# Patient Record
Sex: MALE | Race: WHITE | Employment: FULL TIME | URBAN - METROPOLITAN AREA
[De-identification: names, ages, dates, MRNs, and addresses within clinical notes are randomized per-mention and may not be internally consistent; named-entity substitution may affect disease eponyms.]

---

## 2017-02-17 ENCOUNTER — VIRTUAL VISIT (OUTPATIENT)
Dept: NURSING | Facility: CLINIC | Age: 57
End: 2017-02-17

## 2017-02-17 DIAGNOSIS — E66.9 OBESITY: Primary | ICD-10-CM

## 2017-02-17 PROCEDURE — 99207 ZZC HEALTH COACHING, NO CHARGE: CPT

## 2017-02-17 NOTE — MR AVS SNAPSHOT
After Visit Summary   2/17/2017    Sanket Flores    MRN: [id number]           Patient Information     Date Of Birth          1960        Visit Information        Provider Department      2/17/2017 8:00 AM HEALTH  - [facility]        Today's Diagnoses     Obesity    -  1       Follow-ups after your visit        Who to contact     If you have questions or need follow up information about today's clinic visit or your schedule please contact [facility] directly at [phone].  Normal or non-critical lab and imaging results will be communicated to you by exsulinhart, letter or phone within 4 business days after the clinic has received the results. If you do not hear from us within 7 days, please contact the clinic through exsulinhart or phone. If you have a critical or abnormal lab result, we will notify you by phone as soon as possible.  Submit refill requests through [employer] or call your pharmacy and they will forward the refill request to us. Please allow 3 business days for your refill to be completed.          Additional Information About Your Visit        MyChart Information     [employer] gives you secure access to your electronic health record. If you see a primary care provider, you can also send messages to your care team and make appointments. If you have questions, please call your primary care clinic.  If you do not have a primary care provider, please call [phone] and they will assist you.        Care EveryWhere ID     This is your Care EveryWhere ID. This could be used by other organizations to access your [place] medical records  [id number]         Blood Pressure from Last 3 Encounters:   09/20/16 122/74   06/17/16 123/79   05/06/15 112/77    Weight from Last 3 Encounters:   09/20/16 278 lb 12.8 oz (126.5 kg)   06/17/16 285 lb (129.3 kg)   05/06/15 277 lb (125.6 kg)              Today, you had the following     No orders found for  display       Primary Care Provider Office Phone # Fax #    Anish Morales [phone]       [facility] [address]  [facility] [address]        Thank you!     Thank you for choosing [facility]  for your care. Our goal is always to provide you with excellent care. Hearing back from our patients is one way we can continue to improve our services. Please take a few minutes to complete the written survey that you may receive in the mail after your visit with us. Thank you!             Your Updated Medication List - Protect others around you: Learn how to safely use, store and throw away your medicines at www.disposemymeds.org.          This list is accurate as of: 2/17/17  9:43 AM.  Always use your most recent med list.                   Brand Name Dispense Instructions for use    fexofenadine 180 MG tablet    ALLEGRA    90 tablet    Take  by mouth. 1 tab PO qDay       fish oil-omega-3 fatty acids 1000 MG capsule      Take 2 g by mouth daily.       glucosamine-chondroitin 500-400 MG Caps per capsule      Take 1 capsule by mouth daily       order for DME     1 Units    Equipment being ordered: CPAP and needed supplies, pressure setting 13       ranitidine 150 MG tablet    ZANTAC    180 tablet    Take 1 tablet (150 mg) by mouth 2 times daily       REMICADE 100 MG injection   Generic drug:  inFLIXimab      once       simvastatin 40 MG tablet    ZOCOR    90 tablet    Take 1 tablet (40 mg) by mouth At Bedtime       SUMAtriptan 25 MG tablet    IMITREX    18 tablet    Take 1-2 tablets (25-50 mg) by mouth at onset of headache for migraine May repeat dose in 2 hours.  Do not exceed 200 mg in 24 hours

## 2017-02-17 NOTE — PROGRESS NOTES
February 17, 2017    [address] [id number] [phone]  Health Coaching Progress Note    Patient Name: Sanket Flores Date: February 17, 2017      Session Length: 30      DATA    PRM Master Survey Scores Reviewed: Yes    Core Healthy Days Survey:    Would you say that in general your health is: : Good    TIFFANI Score (Last Two) 7/29/2016 2/17/2017   TIFFANI Raw Score 44 36   Activation Score 70.8 75.5   TIFFANI Level 4 4       PHQ-2 Score 2/17/2017 7/29/2016   PHQ-2 Total Score Interpretation - Positive if 3 or more points; Administer PHQ-9 if positive 0 0       No flowsheet data found.    Treatment Objective(s) Addressed in This Session:  Target Behavior(s): diet/weight loss and disease management/lifestyle changes of continuing to work on weight loss by eating healthier foods-more veggies/protein, reducing portion sizes, learning ways to help manage stress- what is in control vs what is not, keeping a healthy work/life balance, continuing to be more active-tracking daily steps, walking his dog regularly/has to be done,  getting enough sleep, rest and recovery, making a healthy routine/personal time a priority.     Current Stressors / Issues:  Pt. is not happy with himself on progress/feels he can do better, keeping unhealthy food out of house/working on spouse on this, finding ways to be more active daily-got a new dog that needs to be walked, work life balance-work is very stressful right now, lives with Ulcerative Colitis/wondering if this has something to do with recent pain issuse, gluten?, eating the right kinds of food,  little time for self/making personal time a priority, soda intake-stopped drinking diet soda and lost 15lbs, recently built a new house/moved-still putting things away, stress-wife's health-lives with Fibromyalgia/pain      What Patient Does Well:   1) Pt. Continues to make healthy changes to  his routine-really hopes to make exercise a big part of routine and stick to it.  Previous Successes:   1) Pt. Reports he is down at least 13lbs over the past few months on home scale/Pt. hasn't weighed in at clinic since September  Areas in Need of Improvement:   1) Staying consistent on daily routine  2) Exercise-making this a priority  3) Diet  4) Weight loss  5) Stress management  Barriers to Change:   1) Ankle Injury/Shoulder pain/other recent aches/pains-making sure not to overdue it!  2) Time-work/life balance/Work Stress-has been a bigger struggle lately  3) Getting back in routine and sticking to it  Reasons for Change:   1) Be healthier/lose weight  2) Feel better/move better  3) Be around for family  Plan/Goal for the Next 4 Weeks:   GOAL #1: Build a healthy daily routine/enough sleep/stress management-try different techniques (projects at home, riding motorcycle, taking time for self, walking, deep breathing, etc.)  GOAL #1 Progress Toward Goal: 100% (Pt. feels he has a great foundation to help healthy routine/continues to find new methods to manage stressors)  GOAL #2: Get back in routine of doing morning calistenics routine (push-ups, sit-ups, crunches, stretches)  GOAL #2 Progress Toward Goal: Goal no longer appropriate (Doesn't feel his body can do this routine like he used to/walking instead)  GOAL #3: Contine to be active daily by walking/tracking step count/shoot for 10,000 steps daily  GOAL #3 Progress Toward Goal: 75% (Continues being more active/walking the dog-still room for improvement/weather improving)  GOAL #4: Be more aware of dietary choices and use the proper portion size at meals  GOAL #4 Progress Toward Goal: 75% (Working to improve diet/still struggles with spouse bringing home unhealthy foods)  GOAL #5: Learn more about ulcerative colitis and joint pain/try to avoid foods that can cause inflammation  GOAL #5 Progress Toward Goal: 50% (Continuing to research this topic and read  through handouts provided)    Intervention:  Motivational Interviewing    MI Intervention: Expressed Empathy/Understanding, Supported Autonomy, Collaboration, Evocation, Permission to raise concern or advise, Open-ended questions, Rolled with resistance: Simple reflection, Complex reflection, Shifted topic to defuse resistance and Reframed sustain talk in the direction of change and Change talk (evoked)     Change Talk Expressed by the Patient: Desire to change Ability to change Reasons to change Need to change Committment to change Activation Taking steps    Provider Response to Change Talk: E - Evoked more info from patient about behavior change, A - Affirmed patient's thoughts, decisions, or attempts at behavior change, R - Reflected patient's change talk and S - Summarized patient's change talk statements    Assessment / Progress on Treatment Objective(s) / Homework:    Achieved / completed to satisfaction - MAINTENANCE (Working to maintain change, with risk of relapse); Intervened by continuing to positively reinforce healthy behavior choice   Satisfactory progress - ACTION (Actively working towards change); Intervened by reinforcing change plan / affirming steps taken  Stable - PREPARATION (Decided to change - considering how); Intervened by negotiating a change plan and determining options / strategies for behavior change, identifying triggers, exploring social supports, and working towards setting a date to begin behavior change         Plan: (Homework, other):  Patient was encouraged to continue to seek condition-related information and education, as well as schedule a follow up appointment with the Health  as needed. Patient has set self-identified goals and will monitor progress until the next appointment.  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      Rey Morales

## 2017-03-21 ENCOUNTER — TRANSFERRED RECORDS (OUTPATIENT)
Dept: HEALTH INFORMATION MANAGEMENT | Facility: CLINIC | Age: 57
End: 2017-03-21

## 2017-04-22 DIAGNOSIS — K21.9 GASTROESOPHAGEAL REFLUX DISEASE WITHOUT ESOPHAGITIS: ICD-10-CM

## 2017-04-22 NOTE — TELEPHONE ENCOUNTER
Ranitidine      Last Written Prescription Date: 06/17/16  Last Fill Quantity: 180,  # refills: 3   Last Office Visit with G, [facility] or [facility] prescribing provider: 06/17/16 w/Dr Blevins

## 2017-04-24 NOTE — TELEPHONE ENCOUNTER
Should have refills on file through June 2017. One 90 day refill sent.   Prescription approved per [facility] Refill Protocol.  Gm Stauffer RN

## 2017-05-10 ENCOUNTER — TRANSFERRED RECORDS (OUTPATIENT)
Dept: HEALTH INFORMATION MANAGEMENT | Facility: CLINIC | Age: 57
End: 2017-05-10

## 2017-06-27 DIAGNOSIS — Z13.6 CARDIOVASCULAR SCREENING; LDL GOAL LESS THAN 130: ICD-10-CM

## 2017-06-27 RX ORDER — SIMVASTATIN 40 MG
TABLET ORAL
Start: 2017-06-27

## 2017-06-27 NOTE — TELEPHONE ENCOUNTER
simvastatin (ZOCOR) 40 MG tablet    Last Written Prescription Date: 06/17/2016  Last Fill Quantity: 90,03/21/2017 # refills: 3    Last Office Visit with G, P or [facility] prescribing provider:  06/17/2016-Dr Blevins   Future Office Visit:      Cholesterol   Date Value Ref Range Status   06/17/2016 149 <200 mg/dL Final     HDL Cholesterol   Date Value Ref Range Status   06/17/2016 42 >39 mg/dL Final     LDL Cholesterol Calculated   Date Value Ref Range Status   06/17/2016 77 <100 mg/dL Final     Comment:     Desirable:       <100 mg/dl     Triglycerides   Date Value Ref Range Status   06/17/2016 149 <150 mg/dL Final     Comment:     Fasting specimen     Cholesterol/HDL Ratio   Date Value Ref Range Status   05/06/2015 3.0 0.0 - 5.0 Final     ALT   Date Value Ref Range Status   04/21/2016 72 U/L Final

## 2017-06-27 NOTE — TELEPHONE ENCOUNTER
We called Sanket, scheduled fasting visit 6/30/17. He declines a 90 day refill, rather will request at visit. Pharmacy notified.  Gm Stauffer RN

## 2017-06-30 ENCOUNTER — OFFICE VISIT (OUTPATIENT)
Dept: FAMILY MEDICINE | Facility: CLINIC | Age: 57
End: 2017-06-30
Payer: COMMERCIAL

## 2017-06-30 VITALS
TEMPERATURE: 97.6 F | WEIGHT: 283 LBS | HEART RATE: 64 BPM | DIASTOLIC BLOOD PRESSURE: 79 MMHG | RESPIRATION RATE: 16 BRPM | OXYGEN SATURATION: 97 % | BODY MASS INDEX: 40.52 KG/M2 | SYSTOLIC BLOOD PRESSURE: 134 MMHG | HEIGHT: 70 IN

## 2017-06-30 DIAGNOSIS — K51.811 OTHER ULCERATIVE COLITIS WITH RECTAL BLEEDING (H): ICD-10-CM

## 2017-06-30 DIAGNOSIS — K21.9 GASTROESOPHAGEAL REFLUX DISEASE WITHOUT ESOPHAGITIS: ICD-10-CM

## 2017-06-30 DIAGNOSIS — E78.5 HYPERLIPIDEMIA LDL GOAL <100: ICD-10-CM

## 2017-06-30 DIAGNOSIS — F43.22 ADJUSTMENT DISORDER WITH ANXIOUS MOOD: ICD-10-CM

## 2017-06-30 DIAGNOSIS — E66.01 MORBID OBESITY DUE TO EXCESS CALORIES (H): Primary | ICD-10-CM

## 2017-06-30 LAB
ANION GAP SERPL CALCULATED.3IONS-SCNC: 6 MMOL/L (ref 3–14)
BUN SERPL-MCNC: 13 MG/DL (ref 7–30)
CALCIUM SERPL-MCNC: 9.1 MG/DL (ref 8.5–10.1)
CHLORIDE SERPL-SCNC: 108 MMOL/L (ref 94–109)
CHOLEST SERPL-MCNC: 112 MG/DL
CO2 SERPL-SCNC: 25 MMOL/L (ref 20–32)
CREAT SERPL-MCNC: 0.85 MG/DL (ref 0.66–1.25)
GFR SERPL CREATININE-BSD FRML MDRD: ABNORMAL ML/MIN/1.7M2
GLUCOSE SERPL-MCNC: 111 MG/DL (ref 70–99)
HDLC SERPL-MCNC: 31 MG/DL
LDLC SERPL CALC-MCNC: 37 MG/DL
NONHDLC SERPL-MCNC: 81 MG/DL
POTASSIUM SERPL-SCNC: 4.3 MMOL/L (ref 3.4–5.3)
SODIUM SERPL-SCNC: 139 MMOL/L (ref 133–144)
TRIGL SERPL-MCNC: 220 MG/DL

## 2017-06-30 PROCEDURE — 80061 LIPID PANEL: CPT | Performed by: FAMILY MEDICINE

## 2017-06-30 PROCEDURE — 99214 OFFICE O/P EST MOD 30 MIN: CPT | Performed by: FAMILY MEDICINE

## 2017-06-30 PROCEDURE — 36415 COLL VENOUS BLD VENIPUNCTURE: CPT | Performed by: FAMILY MEDICINE

## 2017-06-30 PROCEDURE — 80048 BASIC METABOLIC PNL TOTAL CA: CPT | Performed by: FAMILY MEDICINE

## 2017-06-30 RX ORDER — SIMVASTATIN 40 MG
40 TABLET ORAL AT BEDTIME
Qty: 90 TABLET | Refills: 3 | Status: SHIPPED | OUTPATIENT
Start: 2017-06-30

## 2017-06-30 NOTE — PROGRESS NOTES
SUBJECTIVE:                                                    Sanket Flores is a 57 year old male who presents to clinic today for the following health issues:      Hyperlipidemia Follow-Up      Rate your low fat/cholesterol diet?: poor    Taking statin?  Yes, no muscle aches from statin    Other lipid medications/supplements?:  none      Amount of exercise or physical activity: None    Problems taking medications regularly: No    Medication side effects: none    Diet: regular (no restrictions)      Pt is feeling stressed out in general, agapito when he is at work, he feels he is always wand up and not relaxed.  Gets better when he is on vacation.  Stressors : he needs to be in control, and he feels he his job.  Also he is closing on a house soon, and that is stressful.      Problem list and histories reviewed & adjusted, as indicated.  Additional history: as documented    Patient Active Problem List   Diagnosis     Tobacco use disorder     Atopic rhinitis     CARDIOVASCULAR SCREENING; LDL GOAL LESS THAN 130     Ulcerative colitis (H)     Ulcerative colitis, acute (H)     Anemia     Migraine     PACHECO (obstructive sleep apnea)     Morbid obesity due to excess calories (H)     Past Surgical History:   Procedure Laterality Date     C NONSPECIFIC PROCEDURE  1966    hernia L     C NONSPECIFIC PROCEDURE  1972    compound fx R femur       Social History   Substance Use Topics     Smoking status: Former Smoker     Packs/day: 0.50     Years: 20.00     Types: Cigarettes     Quit date: 2/8/2012     Smokeless tobacco: Never Used      Comment: using e-cigarette     Alcohol use 0.0 oz/week     0 Standard drinks or equivalent per week      Comment: occ beer     Family History   Problem Relation Age of Onset     Cancer - colorectal No family hx of      Prostate Cancer No family hx of          Current Outpatient Prescriptions   Medication Sig Dispense Refill     ranitidine (ZANTAC) 150 MG tablet Take 1 tablet (150 mg) by mouth 2  "times daily 180 tablet 3     simvastatin (ZOCOR) 40 MG tablet Take 1 tablet (40 mg) by mouth At Bedtime 90 tablet 3     glucosamine-chondroitin 500-400 MG CAPS Take 1 capsule by mouth daily       SUMAtriptan (IMITREX) 25 MG tablet Take 1-2 tablets (25-50 mg) by mouth at onset of headache for migraine May repeat dose in 2 hours.  Do not exceed 200 mg in 24 hours 18 tablet 3     inFLIXimab (REMICADE) 100 MG injection once       ORDER FOR DME Equipment being ordered: CPAP and needed supplies, pressure setting 13 1 Units 0     fish oil-omega-3 fatty acids (FISH OIL) 1000 MG capsule Take 2 g by mouth daily.       fexofenadine (ALLEGRA) 180 MG tablet Take  by mouth. 1 tab PO qDay 90 tablet 3     [DISCONTINUED] simvastatin (ZOCOR) 40 MG tablet Take 1 tablet (40 mg) by mouth At Bedtime 90 tablet 3     Allergies   Allergen Reactions     Meperidine Hcl      Demerol       Reviewed and updated as needed this visit by clinical staff  Tobacco  Allergies  Fam Hx  Soc Hx      Reviewed and updated as needed this visit by Provider         ROS:  C: NEGATIVE for fever, chills, change in weight  CV: NEGATIVE for chest pain, palpitations or peripheral edema    OBJECTIVE:     /79 (BP Location: Right arm, Patient Position: Chair, Cuff Size: Adult Large)  Pulse 64  Temp 97.6  F (36.4  C) (Oral)  Resp 16  Ht 5' 10\" (1.778 m)  Wt 283 lb (128.4 kg)  SpO2 97%  BMI 40.61 kg/m2  Body mass index is 40.61 kg/(m^2).  GENERAL: healthy, alert and no distress  RESP: lungs clear to auscultation - no rales, rhonchi or wheezes  CV: regular rate and rhythm, normal S1 S2, no S3 or S4, no murmur, click or rub, no peripheral edema and peripheral pulses strong  MS: no gross musculoskeletal defects noted, no edema    Diagnostic Test Results:  No results found for this or any previous visit (from the past 24 hour(s)).    ASSESSMENT/PLAN:             1. Gastroesophageal reflux disease without esophagitis  Continue on   - ranitidine (ZANTAC) 150 MG " tablet; Take 1 tablet (150 mg) by mouth 2 times daily  Dispense: 180 tablet; Refill: 3    2. Morbid obesity due to excess calories (H)  Talked about diet and weight loss    3. Other ulcerative colitis with rectal bleeding (H)  Still going to GI, well controlled on remicade.    4. Hyperlipidemia LDL goal <100  controlled  - simvastatin (ZOCOR) 40 MG tablet; Take 1 tablet (40 mg) by mouth At Bedtime  Dispense: 90 tablet; Refill: 3  - Lipid Profile with reflex to direct LDL  - Basic metabolic panel    5. Adjustment disorder with anxious mood  Refer to   - MENTAL HEALTH REFERRAL    Talked about stress relief exercies.    Juan Antonio Blevins MD  [facility]

## 2017-06-30 NOTE — MR AVS SNAPSHOT
After Visit Summary   6/30/2017    Sanket Flores    MRN: [id number]           Patient Information     Date Of Birth          1960        Visit Information        Provider Department      6/30/2017 8:15 AM Juan Antonio Blevins MD [facility]        Today's Diagnoses     Morbid obesity due to excess calories (H)    -  1    Gastroesophageal reflux disease without esophagitis        Other ulcerative colitis with rectal bleeding (H)        Hyperlipidemia LDL goal <100        Adjustment disorder with anxious mood           Follow-ups after your visit        Additional Services     MENTAL HEALTH REFERRAL       Your provider has referred you to: [facility]: [facility] - Counseling (Individual/Couples/Family) - [facility] [phone]   [email or web address]/   *Patient will be contacted by [facility] scheduling partner[facility]), to schedule an appointment.  Patients may also call [facility] to schedule.    All scheduling is subject to the client's specific insurance plan & benefits, provider/location availability, and provider clinical specialities.  Please arrive 15 minutes early for your first appointment and bring your completed paperwork.    Please be aware that coverage of these services is subject to the terms and limitations of your health insurance plan.  Call member services at your health plan with any benefit or coverage questions.                  Who to contact     If you have questions or need follow up information about today's clinic visit or your schedule please contact [facility] directly at [phone].  Normal or non-critical lab and imaging results will be communicated to you by MyChart, letter or phone within 4 business days after the clinic has received the results. If you do not hear from us within 7 days, please contact the clinic through [employer]  "or phone. If you have a critical or abnormal lab result, we will notify you by phone as soon as possible.  Submit refill requests through [employer] or call your pharmacy and they will forward the refill request to us. Please allow 3 business days for your refill to be completed.          Additional Information About Your Visit        [employer] Information     [employer] gives you secure access to your electronic health record. If you see a primary care provider, you can also send messages to your care team and make appointments. If you have questions, please call your primary care clinic.  If you do not have a primary care provider, please call [phone] and they will assist you.        Care EveryWhere ID     This is your Care EveryWhere ID. This could be used by other organizations to access your [place] medical records  [id number]        Your Vitals Were     Pulse Temperature Respirations Height Pulse Oximetry BMI (Body Mass Index)    64 97.6  F (36.4  C) (Oral) 16 5' 10\" (1.778 m) 97% 40.61 kg/m2       Blood Pressure from Last 3 Encounters:   06/30/17 134/79   09/20/16 122/74   06/17/16 123/79    Weight from Last 3 Encounters:   06/30/17 283 lb (128.4 kg)   09/20/16 278 lb 12.8 oz (126.5 kg)   06/17/16 285 lb (129.3 kg)              We Performed the Following     Basic metabolic panel     Lipid Profile with reflex to direct LDL     MENTAL HEALTH REFERRAL          Today's Medication Changes          These changes are accurate as of: 6/30/17  8:44 AM.  If you have any questions, ask your nurse or doctor.               These medicines have changed or have updated prescriptions.        Dose/Directions    ranitidine 150 MG tablet   Commonly known as:  ZANTAC   This may have changed:  See the new instructions.   Used for:  Gastroesophageal reflux disease without esophagitis   Changed by:  Juan Antonio Blevins MD        Dose:  150 mg   Take 1 tablet (150 mg) by mouth 2 times daily   Quantity:  180 tablet   Refills:  3          "   Where to get your medicines      These medications were sent to [facility], [place] - [address]  [address] [facility] [address]     Phone:  [phone]     ranitidine 150 MG tablet    simvastatin 40 MG tablet                Primary Care Provider Office Phone # Fax #    Anish Steven Morales [phone]       [facility] [address] [place] [address]        Equal Access to Services     SHAISTA FORD : Hadii aad ku hadasho Soomaali, waaxda luqadaha, qaybta kaalmada adeegyada, waxay idiin hayaan adeeg kharash lamegan veloz. So [facility] [phone].    ATENCIÓN: Si habla español, tiene a harrington disposición servicios gratuitos de asistencia lingüística. [facility] [phone].    We comply with applicable federal civil rights laws and [place] laws. We do not discriminate on the basis of race, color, national origin, age, disability sex, sexual orientation or gender identity.            Thank you!     Thank you for choosing [facility]  for your care. Our goal is always to provide you with excellent care. Hearing back from our patients is one way we can continue to improve our services. Please take a few minutes to complete the written survey that you may receive in the mail after your visit with us. Thank you!             Your Updated Medication List - Protect others around you: Learn how to safely use, store and throw away your medicines at www.disposemymeds.org.          This list is accurate as of: 6/30/17  8:44 AM.  Always use your most recent med list.                   Brand Name Dispense Instructions for use Diagnosis    fexofenadine 180 MG tablet    ALLEGRA    90 tablet    Take  by mouth. 1 tab PO qDay    Allergic rhinitis, cause unspecified       fish oil-omega-3 fatty acids 1000 MG capsule      Take 2 g by mouth daily.        glucosamine-chondroitin 500-400 MG Caps per capsule      Take 1 capsule by mouth daily         order for DME     1 Units    Equipment being ordered: CPAP and needed supplies, pressure setting 13    PACHECO (obstructive sleep apnea)       ranitidine 150 MG tablet    ZANTAC    180 tablet    Take 1 tablet (150 mg) by mouth 2 times daily    Gastroesophageal reflux disease without esophagitis       REMICADE 100 MG injection   Generic drug:  inFLIXimab      once        simvastatin 40 MG tablet    ZOCOR    90 tablet    Take 1 tablet (40 mg) by mouth At Bedtime    Hyperlipidemia LDL goal <100       SUMAtriptan 25 MG tablet    IMITREX    18 tablet    Take 1-2 tablets (25-50 mg) by mouth at onset of headache for migraine May repeat dose in 2 hours.  Do not exceed 200 mg in 24 hours    Headache(784.0)

## 2017-06-30 NOTE — NURSING NOTE
"Chief Complaint   Patient presents with     Recheck Medication     fasting labs     Refill Request       Initial /79 (BP Location: Right arm, Patient Position: Chair, Cuff Size: Adult Large)  Pulse 64  Temp 97.6  F (36.4  C) (Oral)  Resp 16  Ht 5' 10\" (1.778 m)  Wt 283 lb (128.4 kg)  SpO2 97%  BMI 40.61 kg/m2 Estimated body mass index is 40.61 kg/(m^2) as calculated from the following:    Height as of this encounter: 5' 10\" (1.778 m).    Weight as of this encounter: 283 lb (128.4 kg).  Medication Reconciliation: complete   Jayna Aguiar, DANDY      "

## 2017-09-06 ENCOUNTER — TRANSFERRED RECORDS (OUTPATIENT)
Dept: HEALTH INFORMATION MANAGEMENT | Facility: CLINIC | Age: 57
End: 2017-09-06

## 2017-09-28 DIAGNOSIS — K21.9 GASTROESOPHAGEAL REFLUX DISEASE WITHOUT ESOPHAGITIS: ICD-10-CM

## 2017-09-28 NOTE — TELEPHONE ENCOUNTER
ranitidine (ZANTAC) 150 MG tablet      Last Written Prescription Date: 6/30/17  Last Fill Quantity: 180,  # refills: 3   Last Office Visit with BETSEY, [facility] or [facility] prescribing provider: Gen 6/30/17

## 2017-11-21 ENCOUNTER — TRANSFERRED RECORDS (OUTPATIENT)
Dept: HEALTH INFORMATION MANAGEMENT | Facility: CLINIC | Age: 57
End: 2017-11-21

## 2018-01-23 ENCOUNTER — OFFICE VISIT (OUTPATIENT)
Dept: FAMILY MEDICINE | Facility: CLINIC | Age: 58
End: 2018-01-23
Payer: COMMERCIAL

## 2018-01-23 VITALS
TEMPERATURE: 98 F | SYSTOLIC BLOOD PRESSURE: 127 MMHG | HEIGHT: 70 IN | RESPIRATION RATE: 20 BRPM | OXYGEN SATURATION: 94 % | WEIGHT: 296 LBS | DIASTOLIC BLOOD PRESSURE: 83 MMHG | HEART RATE: 90 BPM | BODY MASS INDEX: 42.37 KG/M2

## 2018-01-23 DIAGNOSIS — K51.811 OTHER ULCERATIVE COLITIS WITH RECTAL BLEEDING (H): ICD-10-CM

## 2018-01-23 DIAGNOSIS — J20.9 ACUTE BRONCHITIS, UNSPECIFIED ORGANISM: Primary | ICD-10-CM

## 2018-01-23 DIAGNOSIS — E66.01 MORBID OBESITY DUE TO EXCESS CALORIES (H): ICD-10-CM

## 2018-01-23 PROCEDURE — 99214 OFFICE O/P EST MOD 30 MIN: CPT | Performed by: FAMILY MEDICINE

## 2018-01-23 RX ORDER — AZITHROMYCIN 250 MG/1
TABLET, FILM COATED ORAL
Qty: 6 TABLET | Refills: 0 | Status: SHIPPED | OUTPATIENT
Start: 2018-01-23

## 2018-01-23 NOTE — PROGRESS NOTES
SUBJECTIVE:   Sanket Flores is a 57 year old male who presents to clinic today for the following health issues:      Acute Illness   Acute illness concerns: feels like lungs are inflamed  Onset: 1 day    Fever: no    Chills/Sweats: no    Headache (location?): no    Sinus Pressure:no    Conjunctivitis:  no    Ear Pain: no    Rhinorrhea: YES    Congestion: YES    Sore Throat: YES     Cough: YES-non-productive    Wheeze: no    Decreased Appetite: no    Nausea: no    Vomiting: no    Diarrhea:  no    Dysuria/Freq.: no    Fatigue/Achiness: YES    Sick/Strep Exposure: no     Therapies Tried and outcome: OTC Medicine.          Pt start with burning sensation in the esophagus and throat, and when he takes a deep breath he feels burning into the chest,   Also he feels some congestion and mild cough.    Problem list and histories reviewed & adjusted, as indicated.  Additional history: as documented    Patient Active Problem List   Diagnosis     Tobacco use disorder     Atopic rhinitis     CARDIOVASCULAR SCREENING; LDL GOAL LESS THAN 130     Ulcerative colitis (H)     Ulcerative colitis, acute (H)     Anemia     Migraine     PACHECO (obstructive sleep apnea)     Morbid obesity due to excess calories (H)     Past Surgical History:   Procedure Laterality Date     C NONSPECIFIC PROCEDURE  1966    hernia L     C NONSPECIFIC PROCEDURE  1972    compound fx R femur       Social History   Substance Use Topics     Smoking status: Former Smoker     Packs/day: 0.50     Years: 20.00     Types: Cigarettes     Quit date: 2/8/2012     Smokeless tobacco: Never Used      Comment: using e-cigarette     Alcohol use 0.0 oz/week     0 Standard drinks or equivalent per week      Comment: occ beer     Family History   Problem Relation Age of Onset     Cancer - colorectal No family hx of      Prostate Cancer No family hx of          Current Outpatient Prescriptions   Medication Sig Dispense Refill     ranitidine (ZANTAC) 150 MG tablet Take 1 tablet  "(150 mg) by mouth 2 times daily 180 tablet 3     simvastatin (ZOCOR) 40 MG tablet Take 1 tablet (40 mg) by mouth At Bedtime 90 tablet 3     glucosamine-chondroitin 500-400 MG CAPS Take 1 capsule by mouth daily       SUMAtriptan (IMITREX) 25 MG tablet Take 1-2 tablets (25-50 mg) by mouth at onset of headache for migraine May repeat dose in 2 hours.  Do not exceed 200 mg in 24 hours 18 tablet 3     inFLIXimab (REMICADE) 100 MG injection once       ORDER FOR DME Equipment being ordered: CPAP and needed supplies, pressure setting 13 1 Units 0     fish oil-omega-3 fatty acids (FISH OIL) 1000 MG capsule Take 2 g by mouth daily.       fexofenadine (ALLEGRA) 180 MG tablet Take  by mouth. 1 tab PO qDay 90 tablet 3     Allergies   Allergen Reactions     Meperidine Hcl      Demerol         Reviewed and updated as needed this visit by clinical staffTobacco  Allergies  Meds  Med Hx  Surg Hx  Fam Hx  Soc Hx      Reviewed and updated as needed this visit by Provider         ROS:  C: NEGATIVE for fever, chills, change in weight  CV: NEGATIVE for chest pain, palpitations or peripheral edema    OBJECTIVE:     /83 (BP Location: Right arm, Patient Position: Chair, Cuff Size: Adult Large)  Pulse 90  Temp 98  F (36.7  C) (Oral)  Resp 20  Ht 5' 10\" (1.778 m)  Wt 296 lb (134.3 kg)  SpO2 94%  BMI 42.47 kg/m2  Body mass index is 42.47 kg/(m^2).  Head: Normocephalic, atraumatic.  Eyes: Conjunctiva clear, non icteric. PERRLA.  Ears: External ears nl, TM is nl   Nose: Septum midline, nasal mucosa nl. No discharge.  There is no tenderness over the maxillary sinuses, there is no tenderness over the frontal sinuses  Mouth / Throat: Normal dentition.  No oral lesions. Pharynx mild erythematous, tonsils no exudate/hypertrophy.  Neck: Supple, no enlarged LN, trachea midline.  LUNGS:  CTA B/L, no wheezing or crackles.  CVS : RRR, no murmur, no rub.            ASSESSMENT/PLAN:             1. Acute bronchitis, unspecified " organism  Given that the patient is on DMARD's I will start on   - azithromycin (ZITHROMAX) 250 MG tablet; Take 2 pills today, then 1 pill for 4 days.  Dispense: 6 tablet; Refill: 0  Talked about good hand wash hygiene and ways to avoid illness.    2. Other ulcerative colitis with rectal bleeding (H)  Controlled.    3. Morbid obesity due to excess calories (H)  Today I counseled the patient about diet, regular exercise and weight loss planning.        Follow up in 5 days if symptoms persist, sooner if symptoms worsen or new ones develops, pt may contact us over the phone for any questions or concerns.      Juan Antonio Blevins MD  [facility]

## 2018-01-23 NOTE — MR AVS SNAPSHOT
"              After Visit Summary   1/23/2018    Sanket Flores    MRN: [id number]           Patient Information     Date Of Birth          1960        Visit Information        Provider Department      1/23/2018 2:30 PM Juan Antonio Blevins MD [facility]        Today's Diagnoses     Acute bronchitis, unspecified organism    -  1    Other ulcerative colitis with rectal bleeding (H)           Follow-ups after your visit        Who to contact     If you have questions or need follow up information about today's clinic visit or your schedule please contact [facility] directly at [phone].  Normal or non-critical lab and imaging results will be communicated to you by [employer], letter or phone within 4 business days after the clinic has received the results. If you do not hear from us within 7 days, please contact the clinic through [employer] or phone. If you have a critical or abnormal lab result, we will notify you by phone as soon as possible.  Submit refill requests through [employer] or call your pharmacy and they will forward the refill request to us. Please allow 3 business days for your refill to be completed.          Additional Information About Your Visit        MyChart Information     [employer] gives you secure access to your electronic health record. If you see a primary care provider, you can also send messages to your care team and make appointments. If you have questions, please call your primary care clinic.  If you do not have a primary care provider, please call [phone] and they will assist you.        Care EveryWhere ID     This is your Care EveryWhere ID. This could be used by other organizations to access your [place] medical records  [id number]        Your Vitals Were     Pulse Temperature Respirations Height Pulse Oximetry BMI (Body Mass Index)    90 98  F (36.7  C) (Oral) 20 5' 10\" (1.778 m) 94% 42.47 kg/m2       Blood Pressure from Last 3 Encounters: "   01/23/18 127/83   06/30/17 134/79   09/20/16 122/74    Weight from Last 3 Encounters:   01/23/18 296 lb (134.3 kg)   06/30/17 283 lb (128.4 kg)   09/20/16 278 lb 12.8 oz (126.5 kg)              Today, you had the following     No orders found for display         Today's Medication Changes          These changes are accurate as of: 1/23/18  3:10 PM.  If you have any questions, ask your nurse or doctor.               Start taking these medicines.        Dose/Directions    azithromycin 250 MG tablet   Commonly known as:  ZITHROMAX   Used for:  Acute bronchitis, unspecified organism   Started by:  Juan Antonio Blevins MD        Take 2 pills today, then 1 pill for 4 days.   Quantity:  6 tablet   Refills:  0            Where to get your medicines      These medications [place] [facility] [address]  [address] [facility] [address]     Phone:  [phone]     azithromycin 250 MG tablet                Primary Care Provider Office Phone # Fax #    Anish Steven Morales [phone] [address] [facility] [address]        Equal Access to Services     SHAISTA FORD AH: Hadii stacy perezo Soelmer, waaxda luqadaha, qaybta kaalmada adeegyada, eva veloz. So [facility] [phone].    ATENCIÓN: Si habla español, tiene a harrington disposición servicios gratuitos de asistencia lingüística. Llame al [phone].    We comply with applicable federal civil rights laws and [place] laws. We do not discriminate on the basis of race, color, national origin, age, disability, sex, sexual orientation, or gender identity.            Thank you!     Thank you for choosing [facility]  for your care. Our goal is always to provide you with excellent care. Hearing back from our patients is one way we can continue to improve our services. Please take a few minutes to complete the written survey that you may receive in the mail  after your visit with us. Thank you!             Your Updated Medication List - Protect others around you: Learn how to safely use, store and throw away your medicines at www.disposemymeds.org.          This list is accurate as of: 1/23/18  3:10 PM.  Always use your most recent med list.                   Brand Name Dispense Instructions for use Diagnosis    azithromycin 250 MG tablet    ZITHROMAX    6 tablet    Take 2 pills today, then 1 pill for 4 days.    Acute bronchitis, unspecified organism       fexofenadine 180 MG tablet    ALLEGRA    90 tablet    Take  by mouth. 1 tab PO qDay    Allergic rhinitis, cause unspecified       fish oil-omega-3 fatty acids 1000 MG capsule      Take 2 g by mouth daily.        glucosamine-chondroitin 500-400 MG Caps per capsule      Take 1 capsule by mouth daily        order for DME     1 Units    Equipment being ordered: CPAP and needed supplies, pressure setting 13    PACHECO (obstructive sleep apnea)       ranitidine 150 MG tablet    ZANTAC    180 tablet    Take 1 tablet (150 mg) by mouth 2 times daily    Gastroesophageal reflux disease without esophagitis       REMICADE 100 MG injection   Generic drug:  inFLIXimab      once        simvastatin 40 MG tablet    ZOCOR    90 tablet    Take 1 tablet (40 mg) by mouth At Bedtime    Hyperlipidemia LDL goal <100       SUMAtriptan 25 MG tablet    IMITREX    18 tablet    Take 1-2 tablets (25-50 mg) by mouth at onset of headache for migraine May repeat dose in 2 hours.  Do not exceed 200 mg in 24 hours    Headache(784.0)

## 2018-02-27 ENCOUNTER — TRANSFERRED RECORDS (OUTPATIENT)
Dept: HEALTH INFORMATION MANAGEMENT | Facility: CLINIC | Age: 58
End: 2018-02-27

## 2018-03-02 ENCOUNTER — TRANSFERRED RECORDS (OUTPATIENT)
Dept: HEALTH INFORMATION MANAGEMENT | Facility: CLINIC | Age: 58
End: 2018-03-02

## 2018-03-25 DIAGNOSIS — K21.9 GASTROESOPHAGEAL REFLUX DISEASE WITHOUT ESOPHAGITIS: ICD-10-CM

## 2018-03-26 NOTE — TELEPHONE ENCOUNTER
"Requested Prescriptions   Pending Prescriptions Disp Refills     ranitidine (ZANTAC) 150 MG tablet [Pharmacy Med Name: RANITIDINE HCL 150MG TABS] 180 tablet 0     Sig: TAKE ONE TABLET BY MOUTH TWO TIMES A DAY    H2 Blockers Protocol Passed    3/26/2018 10:55 AM       Passed - Patient is age 12 or older       Passed - Recent (12 mo) or future (30 days) visit within the authorizing provider's specialty    Patient had office visit in the last 12 months or has a visit in the next 30 days with authorizing provider or within the authorizing provider's specialty.  See \"Patient Info\" tab in inbasket, or \"Choose Columns\" in Meds & Orders section of the refill encounter.            Last Written Prescription Date:  06/30/17  Last Fill Quantity: 180,  # refills: 3   Last office visit: 1/23/2018 with prescribing provider:  Dr Blevins   Future Office Visit:      "

## 2018-05-10 ENCOUNTER — TRANSFERRED RECORDS (OUTPATIENT)
Dept: HEALTH INFORMATION MANAGEMENT | Facility: CLINIC | Age: 58
End: 2018-05-10

## 2018-06-11 ENCOUNTER — TELEPHONE (OUTPATIENT)
Dept: FAMILY MEDICINE | Facility: CLINIC | Age: 58
End: 2018-06-11

## 2018-06-11 NOTE — TELEPHONE ENCOUNTER
Pt calls, while traveling was seen in an ER recently.  Diagnosed with TIA, they let him go home 6-7 hours later but recommended see neurologist.   Stable.   Pt advised to call insurance to determine if referral needed, if not you may self refer. If PCP to refer you will need an OV.   Caller agrees to plan.   Gm Stauffer RN

## 2018-06-19 ENCOUNTER — MEDICAL CORRESPONDENCE (OUTPATIENT)
Dept: HEALTH INFORMATION MANAGEMENT | Facility: CLINIC | Age: 58
End: 2018-06-19

## 2018-08-14 ENCOUNTER — MEDICAL CORRESPONDENCE (OUTPATIENT)
Dept: HEALTH INFORMATION MANAGEMENT | Facility: CLINIC | Age: 58
End: 2018-08-14

## 2018-08-14 ENCOUNTER — TRANSFERRED RECORDS (OUTPATIENT)
Dept: HEALTH INFORMATION MANAGEMENT | Facility: CLINIC | Age: 58
End: 2018-08-14

## 2018-10-09 ENCOUNTER — MEDICAL CORRESPONDENCE (OUTPATIENT)
Dept: HEALTH INFORMATION MANAGEMENT | Facility: CLINIC | Age: 58
End: 2018-10-09

## 2018-12-04 ENCOUNTER — MEDICAL CORRESPONDENCE (OUTPATIENT)
Dept: HEALTH INFORMATION MANAGEMENT | Facility: CLINIC | Age: 58
End: 2018-12-04

## 2019-01-29 ENCOUNTER — TRANSFERRED RECORDS (OUTPATIENT)
Dept: HEALTH INFORMATION MANAGEMENT | Facility: CLINIC | Age: 59
End: 2019-01-29

## 2019-01-29 ENCOUNTER — MEDICAL CORRESPONDENCE (OUTPATIENT)
Dept: HEALTH INFORMATION MANAGEMENT | Facility: CLINIC | Age: 59
End: 2019-01-29

## 2019-10-01 ENCOUNTER — HEALTH MAINTENANCE LETTER (OUTPATIENT)
Age: 59
End: 2019-10-01

## 2021-01-15 ENCOUNTER — HEALTH MAINTENANCE LETTER (OUTPATIENT)
Age: 61
End: 2021-01-15

## 2021-10-24 ENCOUNTER — HEALTH MAINTENANCE LETTER (OUTPATIENT)
Age: 61
End: 2021-10-24

## 2022-02-13 ENCOUNTER — HEALTH MAINTENANCE LETTER (OUTPATIENT)
Age: 62
End: 2022-02-13

## 2022-10-16 ENCOUNTER — HEALTH MAINTENANCE LETTER (OUTPATIENT)
Age: 62
End: 2022-10-16

## 2023-11-04 ENCOUNTER — HEALTH MAINTENANCE LETTER (OUTPATIENT)
Age: 63
End: 2023-11-04